# Patient Record
Sex: FEMALE | Race: WHITE | NOT HISPANIC OR LATINO
[De-identification: names, ages, dates, MRNs, and addresses within clinical notes are randomized per-mention and may not be internally consistent; named-entity substitution may affect disease eponyms.]

---

## 2023-07-17 ENCOUNTER — TRANSCRIPTION ENCOUNTER (OUTPATIENT)
Age: 73
End: 2023-07-17

## 2023-07-17 ENCOUNTER — APPOINTMENT (OUTPATIENT)
Dept: ORTHOPEDIC SURGERY | Facility: CLINIC | Age: 73
End: 2023-07-17
Payer: MEDICARE

## 2023-07-17 VITALS — HEIGHT: 61 IN | WEIGHT: 148 LBS | BODY MASS INDEX: 27.94 KG/M2

## 2023-07-17 DIAGNOSIS — G35 MULTIPLE SCLEROSIS: ICD-10-CM

## 2023-07-17 DIAGNOSIS — Z78.9 OTHER SPECIFIED HEALTH STATUS: ICD-10-CM

## 2023-07-17 DIAGNOSIS — Z85.51 PERSONAL HISTORY OF MALIGNANT NEOPLASM OF BLADDER: ICD-10-CM

## 2023-07-17 PROBLEM — Z00.00 ENCOUNTER FOR PREVENTIVE HEALTH EXAMINATION: Status: ACTIVE | Noted: 2023-07-17

## 2023-07-17 PROCEDURE — 73562 X-RAY EXAM OF KNEE 3: CPT | Mod: FY,50

## 2023-07-17 PROCEDURE — 99203 OFFICE O/P NEW LOW 30 MIN: CPT

## 2023-07-17 RX ORDER — SODIUM SULFATE, POTASSIUM SULFATE AND MAGNESIUM SULFATE 1.6; 3.13; 17.5 G/177ML; G/177ML; G/177ML
17.5-3.13-1.6 SOLUTION ORAL
Qty: 354 | Refills: 0 | Status: ACTIVE | COMMUNITY
Start: 2023-05-01

## 2023-07-17 RX ORDER — BENAZEPRIL HYDROCHLORIDE 20 MG/1
20 TABLET, FILM COATED ORAL
Qty: 135 | Refills: 0 | Status: ACTIVE | COMMUNITY
Start: 2023-02-02

## 2023-07-17 RX ORDER — ROPINIROLE 0.25 MG/1
0.25 TABLET, FILM COATED ORAL
Qty: 90 | Refills: 0 | Status: ACTIVE | COMMUNITY
Start: 2022-06-08

## 2023-07-17 RX ORDER — DULOXETINE HYDROCHLORIDE 30 MG/1
30 CAPSULE, DELAYED RELEASE PELLETS ORAL
Qty: 60 | Refills: 0 | Status: ACTIVE | COMMUNITY
Start: 2023-01-26

## 2023-07-17 RX ORDER — HYDROCHLOROTHIAZIDE 12.5 MG/1
12.5 TABLET ORAL
Qty: 90 | Refills: 0 | Status: ACTIVE | COMMUNITY
Start: 2023-02-02

## 2023-07-17 RX ORDER — ROSUVASTATIN CALCIUM 5 MG/1
5 TABLET, FILM COATED ORAL
Qty: 90 | Refills: 0 | Status: ACTIVE | COMMUNITY
Start: 2023-02-05

## 2023-07-17 RX ORDER — BACLOFEN 10 MG/1
10 TABLET ORAL
Qty: 360 | Refills: 0 | Status: ACTIVE | COMMUNITY
Start: 2022-12-20

## 2023-07-17 RX ORDER — ALBUTEROL SULFATE 90 UG/1
108 (90 BASE) INHALANT RESPIRATORY (INHALATION)
Qty: 6 | Refills: 0 | Status: ACTIVE | COMMUNITY
Start: 2023-04-06

## 2023-07-17 RX ORDER — FAMOTIDINE 40 MG/1
40 TABLET, FILM COATED ORAL
Qty: 90 | Refills: 0 | Status: ACTIVE | COMMUNITY
Start: 2023-06-13

## 2023-07-17 RX ORDER — AZITHROMYCIN 250 MG/1
250 TABLET, FILM COATED ORAL
Qty: 6 | Refills: 0 | Status: ACTIVE | COMMUNITY
Start: 2023-04-06

## 2023-07-17 NOTE — PHYSICAL EXAM
[Bilateral] : knee bilaterally [de-identified] : Constitutional: The patient appears well developed, well nourished. Examination of patients ability to communicate functionally was normal.  \par \par  \par \par Neurologic: Coordination is normal. Alert and oriented to time, place and person. No evidence of mood disorder, calm affect.  \par \par  \par \par RIGHT      KNEE  : Inspection of the knee is as follows:  MILD effusion. no ecchymosis, no streaking, no erythema, no atrophy, no deformities of the quad tendon and no deformities of patellar tendon.  \par \par  \par \par Palpation of the knee is as follows: medial joint line tenderness, lateral joint line tenderness, medial facet of patella tenderness, lateral facet of patella tenderness and crepitus. no palpable masses and no increased warmth.  \par \par  \par \par Knee Range of Motion is as follows in degrees:   \par \par  \par \par Extension: 0  \par \par Flexion: 105   \par \par  \par \par Strength examination of the knee is as follows:  \par \par Quadriceps strength is 5/5  \par \par Hamstring strength is 5/5  \par \par  \par \par Ligament Stability and Special Test ligamentously stable, negative anterior draw, negative Lachman test, negative posterior draw and no varus or valgus instability.  \par \par negative McMurrays test and able to do active straight leg raise without an extensor lag.  \par \par  \par \par Neurological examination of the knee is as follows: light touch is intact throughout.  \par \par  \par \par Gait and function is as follows: mildly antalgic gait. \par \par Constitutional: The patient appears well developed, well nourished. Examination of patients ability to communicate functionally was normal.  \par \par  \par \par Neurologic: Coordination is normal. Alert and oriented to time, place and person. No evidence of mood disorder, calm affect.  \par \par  \par \par    LEFT   KNEE  : Inspection of the knee is as follows: MILD  effusion. no ecchymosis, no streaking, no erythema, no atrophy, no deformities of the quad tendon and no deformities of patellar tendon.  \par \par  \par \par Palpation of the knee is as follows: medial joint line tenderness,  medial facet of patella tenderness, lateral facet of patella tenderness and crepitus. no palpable masses and no increased warmth.  \par \par  \par \par Knee Range of Motion is as follows in degrees:   \par \par  \par \par Extension: 0  \par \par Flexion: 115   \par \par  \par \par Strength examination of the knee is as follows:  \par \par Quadriceps strength is 5/5  \par \par Hamstring strength is 5/5  \par \par  \par \par Ligament Stability and Special Test ligamentously stable, negative anterior draw, negative Lachman test, negative posterior draw and no varus or valgus instability.  \par \par negative McMurrays test and able to do active straight leg raise without an extensor lag.  \par \par  \par \par Neurological examination of the knee is as follows: light touch is intact throughout.  \par \par  \par \par Gait and function is as follows: mildly antalgic gait.  [FreeTextEntry9] : ap/lat/skiers show no significant DJD frx.
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
## 2023-07-17 NOTE — DISCUSSION/SUMMARY
[de-identified] : Extensive discussion of the options was had with the patient. This discussion included both surgical and nonsurgical options. Options including but not limited to cortisone injection, viscosupplementation,  physical therapy, oral anti-inflammatories, MRI, arthroplasty and arthroscopy were discussed with the patient. We also discussed the option of observation, allowing the patient to continue rest, ice, NSAIDs and following up if the condition does not improve. Time was taken to go over any questions the patient had in regards to any of the treatment plans described. \par \par At this time the plan is for the patient to observe and continue self care including but not limited to HEP, Ice and rest. Patient was instructed to f/u as needed \par \par Entered by Joss Herron acting as scribe.\par Dr. Sales Attestation\par The documentation recorded by the scribe, in my presence, accurately reflects the service I, Dr. Sales, personally performed, and the decisions made by me with my edits as appropriate.

## 2023-09-11 ENCOUNTER — APPOINTMENT (OUTPATIENT)
Dept: ORTHOPEDIC SURGERY | Facility: CLINIC | Age: 73
End: 2023-09-11
Payer: MEDICARE

## 2023-09-11 DIAGNOSIS — M70.51 OTHER BURSITIS OF KNEE, RIGHT KNEE: ICD-10-CM

## 2023-09-11 DIAGNOSIS — M17.12 UNILATERAL PRIMARY OSTEOARTHRITIS, LEFT KNEE: ICD-10-CM

## 2023-09-11 DIAGNOSIS — M70.52 OTHER BURSITIS OF KNEE, RIGHT KNEE: ICD-10-CM

## 2023-09-11 DIAGNOSIS — M17.11 UNILATERAL PRIMARY OSTEOARTHRITIS, RIGHT KNEE: ICD-10-CM

## 2023-09-11 PROCEDURE — 99214 OFFICE O/P EST MOD 30 MIN: CPT
